# Patient Record
Sex: MALE | Race: WHITE | NOT HISPANIC OR LATINO | URBAN - METROPOLITAN AREA
[De-identification: names, ages, dates, MRNs, and addresses within clinical notes are randomized per-mention and may not be internally consistent; named-entity substitution may affect disease eponyms.]

---

## 2024-10-20 ENCOUNTER — INPATIENT (INPATIENT)
Facility: HOSPITAL | Age: 84
LOS: 2 days | Discharge: HOME HEALTH | DRG: 177 | End: 2024-10-23
Attending: FAMILY MEDICINE | Admitting: STUDENT IN AN ORGANIZED HEALTH CARE EDUCATION/TRAINING PROGRAM
Payer: MEDICARE

## 2024-10-20 VITALS
OXYGEN SATURATION: 87 % | HEART RATE: 123 BPM | SYSTOLIC BLOOD PRESSURE: 155 MMHG | DIASTOLIC BLOOD PRESSURE: 83 MMHG | TEMPERATURE: 99 F | RESPIRATION RATE: 18 BRPM

## 2024-10-20 DIAGNOSIS — J18.9 PNEUMONIA, UNSPECIFIED ORGANISM: ICD-10-CM

## 2024-10-20 PROBLEM — J44.9 CHRONIC OBSTRUCTIVE PULMONARY DISEASE, UNSPECIFIED: Chronic | Status: ACTIVE | Noted: 2021-11-18

## 2024-10-20 PROBLEM — I48.91 UNSPECIFIED ATRIAL FIBRILLATION: Chronic | Status: ACTIVE | Noted: 2019-11-04

## 2024-10-20 PROBLEM — I48.91 UNSPECIFIED ATRIAL FIBRILLATION: Chronic | Status: ACTIVE | Noted: 2019-11-02

## 2024-10-20 PROBLEM — I10 ESSENTIAL (PRIMARY) HYPERTENSION: Chronic | Status: ACTIVE | Noted: 2021-11-18

## 2024-10-20 LAB
ALBUMIN SERPL ELPH-MCNC: 3 G/DL — LOW (ref 3.3–5)
ALP SERPL-CCNC: 103 U/L — SIGNIFICANT CHANGE UP (ref 40–120)
ALT FLD-CCNC: 27 U/L — SIGNIFICANT CHANGE UP (ref 12–78)
ANION GAP SERPL CALC-SCNC: 5 MMOL/L — SIGNIFICANT CHANGE UP (ref 5–17)
APTT BLD: 49.8 SEC — HIGH (ref 24.5–35.6)
AST SERPL-CCNC: 24 U/L — SIGNIFICANT CHANGE UP (ref 15–37)
BASOPHILS # BLD AUTO: 0.04 K/UL — SIGNIFICANT CHANGE UP (ref 0–0.2)
BASOPHILS NFR BLD AUTO: 0.4 % — SIGNIFICANT CHANGE UP (ref 0–2)
BILIRUB SERPL-MCNC: 0.7 MG/DL — SIGNIFICANT CHANGE UP (ref 0.2–1.2)
BUN SERPL-MCNC: 26 MG/DL — HIGH (ref 7–23)
CALCIUM SERPL-MCNC: 9 MG/DL — SIGNIFICANT CHANGE UP (ref 8.5–10.1)
CHLORIDE SERPL-SCNC: 103 MMOL/L — SIGNIFICANT CHANGE UP (ref 96–108)
CO2 SERPL-SCNC: 26 MMOL/L — SIGNIFICANT CHANGE UP (ref 22–31)
CREAT SERPL-MCNC: 1.17 MG/DL — SIGNIFICANT CHANGE UP (ref 0.5–1.3)
EGFR: 61 ML/MIN/1.73M2 — SIGNIFICANT CHANGE UP
EOSINOPHIL # BLD AUTO: 0.03 K/UL — SIGNIFICANT CHANGE UP (ref 0–0.5)
EOSINOPHIL NFR BLD AUTO: 0.3 % — SIGNIFICANT CHANGE UP (ref 0–6)
FLUAV AG NPH QL: SIGNIFICANT CHANGE UP
FLUBV AG NPH QL: SIGNIFICANT CHANGE UP
GLUCOSE SERPL-MCNC: 128 MG/DL — HIGH (ref 70–99)
HCT VFR BLD CALC: 37.4 % — LOW (ref 39–50)
HGB BLD-MCNC: 12.4 G/DL — LOW (ref 13–17)
IMM GRANULOCYTES NFR BLD AUTO: 0.5 % — SIGNIFICANT CHANGE UP (ref 0–0.9)
INR BLD: 1.31 RATIO — HIGH (ref 0.85–1.16)
LACTATE SERPL-SCNC: 1.3 MMOL/L — SIGNIFICANT CHANGE UP (ref 0.7–2)
LYMPHOCYTES # BLD AUTO: 1.02 K/UL — SIGNIFICANT CHANGE UP (ref 1–3.3)
LYMPHOCYTES # BLD AUTO: 9.9 % — LOW (ref 13–44)
MAGNESIUM SERPL-MCNC: 1.6 MG/DL — SIGNIFICANT CHANGE UP (ref 1.6–2.6)
MCHC RBC-ENTMCNC: 32.1 PG — SIGNIFICANT CHANGE UP (ref 27–34)
MCHC RBC-ENTMCNC: 33.2 GM/DL — SIGNIFICANT CHANGE UP (ref 32–36)
MCV RBC AUTO: 96.9 FL — SIGNIFICANT CHANGE UP (ref 80–100)
MONOCYTES # BLD AUTO: 1.38 K/UL — HIGH (ref 0–0.9)
MONOCYTES NFR BLD AUTO: 13.5 % — SIGNIFICANT CHANGE UP (ref 2–14)
NEUTROPHILS # BLD AUTO: 7.74 K/UL — HIGH (ref 1.8–7.4)
NEUTROPHILS NFR BLD AUTO: 75.4 % — SIGNIFICANT CHANGE UP (ref 43–77)
NT-PROBNP SERPL-SCNC: 2072 PG/ML — HIGH (ref 0–450)
PLATELET # BLD AUTO: 291 K/UL — SIGNIFICANT CHANGE UP (ref 150–400)
POTASSIUM SERPL-MCNC: 4.3 MMOL/L — SIGNIFICANT CHANGE UP (ref 3.5–5.3)
POTASSIUM SERPL-SCNC: 4.3 MMOL/L — SIGNIFICANT CHANGE UP (ref 3.5–5.3)
PROT SERPL-MCNC: 7.1 GM/DL — SIGNIFICANT CHANGE UP (ref 6–8.3)
PROTHROM AB SERPL-ACNC: 15 SEC — HIGH (ref 9.9–13.4)
RBC # BLD: 3.86 M/UL — LOW (ref 4.2–5.8)
RBC # FLD: 12.9 % — SIGNIFICANT CHANGE UP (ref 10.3–14.5)
RSV RNA NPH QL NAA+NON-PROBE: SIGNIFICANT CHANGE UP
SARS-COV-2 RNA SPEC QL NAA+PROBE: SIGNIFICANT CHANGE UP
SODIUM SERPL-SCNC: 134 MMOL/L — LOW (ref 135–145)
TROPONIN I, HIGH SENSITIVITY RESULT: 14.38 NG/L — SIGNIFICANT CHANGE UP
WBC # BLD: 10.26 K/UL — SIGNIFICANT CHANGE UP (ref 3.8–10.5)
WBC # FLD AUTO: 10.26 K/UL — SIGNIFICANT CHANGE UP (ref 3.8–10.5)

## 2024-10-20 PROCEDURE — 97116 GAIT TRAINING THERAPY: CPT | Mod: GP

## 2024-10-20 PROCEDURE — 80162 ASSAY OF DIGOXIN TOTAL: CPT

## 2024-10-20 PROCEDURE — 71275 CT ANGIOGRAPHY CHEST: CPT | Mod: 26

## 2024-10-20 PROCEDURE — 93010 ELECTROCARDIOGRAM REPORT: CPT

## 2024-10-20 PROCEDURE — 94640 AIRWAY INHALATION TREATMENT: CPT

## 2024-10-20 PROCEDURE — 99285 EMERGENCY DEPT VISIT HI MDM: CPT

## 2024-10-20 PROCEDURE — 84443 ASSAY THYROID STIM HORMONE: CPT

## 2024-10-20 PROCEDURE — 93306 TTE W/DOPPLER COMPLETE: CPT

## 2024-10-20 PROCEDURE — 80048 BASIC METABOLIC PNL TOTAL CA: CPT

## 2024-10-20 PROCEDURE — 71275 CT ANGIOGRAPHY CHEST: CPT | Mod: MC

## 2024-10-20 PROCEDURE — 76376 3D RENDER W/INTRP POSTPROCES: CPT

## 2024-10-20 PROCEDURE — 71045 X-RAY EXAM CHEST 1 VIEW: CPT | Mod: 26

## 2024-10-20 PROCEDURE — 85027 COMPLETE CBC AUTOMATED: CPT

## 2024-10-20 PROCEDURE — 36415 COLL VENOUS BLD VENIPUNCTURE: CPT

## 2024-10-20 PROCEDURE — 99223 1ST HOSP IP/OBS HIGH 75: CPT

## 2024-10-20 PROCEDURE — 97163 PT EVAL HIGH COMPLEX 45 MIN: CPT | Mod: GP

## 2024-10-20 PROCEDURE — 85025 COMPLETE CBC W/AUTO DIFF WBC: CPT

## 2024-10-20 RX ORDER — IPRATROPIUM BROMIDE AND ALBUTEROL SULFATE .5; 2.5 MG/3ML; MG/3ML
3 SOLUTION RESPIRATORY (INHALATION) EVERY 6 HOURS
Refills: 0 | Status: DISCONTINUED | OUTPATIENT
Start: 2024-10-20 | End: 2024-10-23

## 2024-10-20 RX ORDER — TIOTROPIUM BROMIDE INHALATION SPRAY 1.56 UG/1
2 SPRAY, METERED RESPIRATORY (INHALATION) DAILY
Refills: 0 | Status: DISCONTINUED | OUTPATIENT
Start: 2024-10-20 | End: 2024-10-22

## 2024-10-20 RX ORDER — AMLODIPINE BESYLATE 10 MG
10 TABLET ORAL DAILY
Refills: 0 | Status: DISCONTINUED | OUTPATIENT
Start: 2024-10-20 | End: 2024-10-23

## 2024-10-20 RX ORDER — DILTIAZEM HCL 5 MG/ML
10 VIAL (ML) INTRAVENOUS ONCE
Refills: 0 | Status: DISCONTINUED | OUTPATIENT
Start: 2024-10-20 | End: 2024-10-22

## 2024-10-20 RX ORDER — IPRATROPIUM BROMIDE AND ALBUTEROL SULFATE .5; 2.5 MG/3ML; MG/3ML
3 SOLUTION RESPIRATORY (INHALATION) ONCE
Refills: 0 | Status: COMPLETED | OUTPATIENT
Start: 2024-10-20 | End: 2024-10-20

## 2024-10-20 RX ORDER — CEFTRIAXONE SODIUM 10 G
1000 VIAL (EA) INJECTION ONCE
Refills: 0 | Status: COMPLETED | OUTPATIENT
Start: 2024-10-20 | End: 2024-10-20

## 2024-10-20 RX ORDER — DOXYCYCLINE HYCLATE 100 MG/1
100 TABLET, FILM COATED ORAL EVERY 12 HOURS
Refills: 0 | Status: DISCONTINUED | OUTPATIENT
Start: 2024-10-20 | End: 2024-10-23

## 2024-10-20 RX ORDER — AMLODIPINE BESYLATE 10 MG
1 TABLET ORAL
Refills: 0 | DISCHARGE

## 2024-10-20 RX ORDER — METHYLPREDNISOLONE ACETATE 80 MG/ML
125 INJECTION, SUSPENSION INTRALESIONAL; INTRAMUSCULAR; INTRASYNOVIAL; SOFT TISSUE ONCE
Refills: 0 | Status: COMPLETED | OUTPATIENT
Start: 2024-10-20 | End: 2024-10-20

## 2024-10-20 RX ORDER — POTASSIUM CHLORIDE 10 MEQ
10 TABLET, EXTENDED RELEASE ORAL DAILY
Refills: 0 | Status: DISCONTINUED | OUTPATIENT
Start: 2024-10-20 | End: 2024-10-20

## 2024-10-20 RX ORDER — FUROSEMIDE 40 MG
40 TABLET ORAL ONCE
Refills: 0 | Status: COMPLETED | OUTPATIENT
Start: 2024-10-20 | End: 2024-10-20

## 2024-10-20 RX ORDER — MELATONIN 5 MG
3 TABLET ORAL AT BEDTIME
Refills: 0 | Status: DISCONTINUED | OUTPATIENT
Start: 2024-10-20 | End: 2024-10-23

## 2024-10-20 RX ORDER — ONDANSETRON HYDROCHLORIDE 2 MG/ML
4 INJECTION, SOLUTION INTRAMUSCULAR; INTRAVENOUS EVERY 6 HOURS
Refills: 0 | Status: DISCONTINUED | OUTPATIENT
Start: 2024-10-20 | End: 2024-10-23

## 2024-10-20 RX ORDER — METHYLPREDNISOLONE ACETATE 80 MG/ML
40 INJECTION, SUSPENSION INTRALESIONAL; INTRAMUSCULAR; INTRASYNOVIAL; SOFT TISSUE DAILY
Refills: 0 | Status: COMPLETED | OUTPATIENT
Start: 2024-10-20 | End: 2024-10-23

## 2024-10-20 RX ORDER — AZITHROMYCIN DIHYDRATE 200 MG/5ML
500 POWDER, FOR SUSPENSION ORAL ONCE
Refills: 0 | Status: COMPLETED | OUTPATIENT
Start: 2024-10-20 | End: 2024-10-20

## 2024-10-20 RX ORDER — FUROSEMIDE 40 MG
1 TABLET ORAL
Refills: 0 | DISCHARGE

## 2024-10-20 RX ORDER — FUROSEMIDE 40 MG
40 TABLET ORAL DAILY
Refills: 0 | Status: DISCONTINUED | OUTPATIENT
Start: 2024-10-20 | End: 2024-10-23

## 2024-10-20 RX ORDER — MAGNESIUM, ALUMINUM HYDROXIDE 200-200 MG
30 TABLET,CHEWABLE ORAL EVERY 4 HOURS
Refills: 0 | Status: DISCONTINUED | OUTPATIENT
Start: 2024-10-20 | End: 2024-10-23

## 2024-10-20 RX ORDER — ACETAMINOPHEN 500 MG
650 TABLET ORAL EVERY 6 HOURS
Refills: 0 | Status: DISCONTINUED | OUTPATIENT
Start: 2024-10-20 | End: 2024-10-23

## 2024-10-20 RX ORDER — CEFTRIAXONE SODIUM 10 G
1000 VIAL (EA) INJECTION EVERY 24 HOURS
Refills: 0 | Status: DISCONTINUED | OUTPATIENT
Start: 2024-10-21 | End: 2024-10-23

## 2024-10-20 RX ORDER — ACETAMINOPHEN 500 MG
1000 TABLET ORAL ONCE
Refills: 0 | Status: COMPLETED | OUTPATIENT
Start: 2024-10-20 | End: 2024-10-20

## 2024-10-20 RX ORDER — RIVAROXABAN 20 MG/1
20 TABLET, FILM COATED ORAL
Refills: 0 | Status: DISCONTINUED | OUTPATIENT
Start: 2024-10-20 | End: 2024-10-23

## 2024-10-20 RX ORDER — METOPROLOL TARTRATE 50 MG
100 TABLET ORAL DAILY
Refills: 0 | Status: DISCONTINUED | OUTPATIENT
Start: 2024-10-20 | End: 2024-10-23

## 2024-10-20 RX ORDER — DIGOXIN 250 MCG
250 TABLET ORAL DAILY
Refills: 0 | Status: DISCONTINUED | OUTPATIENT
Start: 2024-10-20 | End: 2024-10-22

## 2024-10-20 RX ORDER — METOPROLOL TARTRATE 50 MG
5 TABLET ORAL ONCE
Refills: 0 | Status: COMPLETED | OUTPATIENT
Start: 2024-10-20 | End: 2024-10-20

## 2024-10-20 RX ADMIN — Medication 5 MILLIGRAM(S): at 06:56

## 2024-10-20 RX ADMIN — DOXYCYCLINE HYCLATE 110 MILLIGRAM(S): 100 TABLET, FILM COATED ORAL at 22:40

## 2024-10-20 RX ADMIN — Medication 400 MILLIGRAM(S): at 06:10

## 2024-10-20 RX ADMIN — Medication 40 MILLIGRAM(S): at 06:02

## 2024-10-20 RX ADMIN — Medication 1000 MILLIGRAM(S): at 07:15

## 2024-10-20 RX ADMIN — AZITHROMYCIN DIHYDRATE 255 MILLIGRAM(S): 200 POWDER, FOR SUSPENSION ORAL at 05:03

## 2024-10-20 RX ADMIN — Medication 80 MILLIGRAM(S): at 22:40

## 2024-10-20 RX ADMIN — Medication 10 MILLIGRAM(S): at 18:33

## 2024-10-20 RX ADMIN — METHYLPREDNISOLONE ACETATE 125 MILLIGRAM(S): 80 INJECTION, SUSPENSION INTRALESIONAL; INTRAMUSCULAR; INTRASYNOVIAL; SOFT TISSUE at 08:03

## 2024-10-20 RX ADMIN — IPRATROPIUM BROMIDE AND ALBUTEROL SULFATE 3 MILLILITER(S): .5; 2.5 SOLUTION RESPIRATORY (INHALATION) at 08:03

## 2024-10-20 RX ADMIN — RIVAROXABAN 20 MILLIGRAM(S): 20 TABLET, FILM COATED ORAL at 18:33

## 2024-10-20 RX ADMIN — Medication 1000 MILLIGRAM(S): at 04:54

## 2024-10-20 RX ADMIN — IPRATROPIUM BROMIDE AND ALBUTEROL SULFATE 3 MILLILITER(S): .5; 2.5 SOLUTION RESPIRATORY (INHALATION) at 04:59

## 2024-10-20 RX ADMIN — Medication 250 MICROGRAM(S): at 18:33

## 2024-10-20 RX ADMIN — Medication 100 MILLIGRAM(S): at 12:43

## 2024-10-20 NOTE — H&P ADULT - ASSESSMENT
84-year-old male with past medical history of A-fib on Xarelto, HTN, HLD, COPD (not on home oxygen), presents to [facility] complaining of shortness of breath x 2 days.      #Acute Hypoxic Respiratory Failure, unable to rule out if Gram negative in etiology, Hx of COPD, possible COPD exacerbation   -RVP: negative   -CXR: as above   -CTA as above   -IV antibiotics: Doxy/Rocephin    -Solumedrol 40mg daily  -Spiriva   -Albuterol PRN   -supplemental oxygen PRN with target SpO2: 92-96%  -incentive spirometry    #Afib with RVR   -likely exacerbated due to illness   -c/w Xarelto   -c/w Digoxin  -c/w Metoprolol Succinate     #Abnormal CT findings  -9 mm pulmonary nodule in the right upper lobe, new from prior, nonspecific areas of architectural distortion in the left upper lobe, additional groundglass area space passages in the right upper lobe and superior segment of the right lower lobe, 6 mm spiculated pulmonary nodule in the right upper lobe series new from prior: Follow-up as outpatient  -Multiple thyroid nodules: Follow-up as outpatient  -Multiple punctate calcification in the pancreas: Follow-up as outpatient  -Mild thickening of the left adrenal gland: Follow-up as outpatient  -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

## 2024-10-20 NOTE — ED ADULT NURSE REASSESSMENT NOTE - NS ED NURSE REASSESS COMMENT FT1
Received patient AxOx4, spouse at bedside. VSS. Patient with improvement to HR to the 80's to low 100's. -110. MD horowitz at bedside and aware. Advised to hold IV Cardizem at this time and continue to monitor HR and BP. Patient with dyspnea on exertion, oxygen via nasal cannula in place at 3L. Tolerating well. Updated on plan of care, all questions and concerns addressed. Cardiac monitoring maintained. Stretcher locked and in lowest position. Comfort and safety measures maintained, call bell within reach, will continue to monitor.

## 2024-10-20 NOTE — ED PROVIDER NOTE - CLINICAL SUMMARY MEDICAL DECISION MAKING FREE TEXT BOX
83 yo M with COPD now with URI from travel.  Viral illness vs pneumonia vs COPD exacerbation vs afib with CHF in differential.      EKG, CXR, labs, Flu/RSV/Covid swab, treatment, O2 and likely admission.

## 2024-10-20 NOTE — ED PROVIDER NOTE - RESPIRATORY, MLM
+tachypnea; mild retractions and conversational dyspnea; +end expiratory wheezing in bases; +coarse upper airway sounds

## 2024-10-20 NOTE — ED ADULT TRIAGE NOTE - CHIEF COMPLAINT QUOTE
BIBEMS from home c/o difficulty breathing. PMH COPD. Reports being away in [place] and everyone on the trip got sick with "some sort of pneumonia." +HA, subjective fevers, productive cough. Pt took 1x dose of at- home inhaler with no relief, given 1x Albuterol by EMS. O2% on room air 87% placed on 4L NC.  in triage, sent for EKG. Denies chest pain/ tightness. Airway patent, speaking in full complete sentences.

## 2024-10-20 NOTE — ED PROVIDER NOTE - PROGRESS NOTE DETAILS
JG: Patient with CHF and pneumonia.  Med list contains lasix 20mg daily.  Will give lasix 40mg IV to double oral dose.  Discussed case with hospitalist for admission to [facility]. HR improved to 107, but will give some metoprolol for rate control.

## 2024-10-20 NOTE — ED PROVIDER NOTE - OBJECTIVE STATEMENT
Pt. is a 85 yo M with hx of COPD (not on home O2), hx of HTN, hyperlipidemia, Afib on xarelto presents with fever, chills, wet cough and SOB X 2 days.  States he was away on a cruise to [facility] for 2 wks. States there were many passengers with cough and illness.  Patient used inhaler without relief.  Came to the hospital because he believes he could have pneumonia.  PMD: Linker

## 2024-10-20 NOTE — ED ADULT NURSE NOTE - NSFALLLASTSIX_ED_ALL_ED
Multiple abrasions on head and all extremities. Dressings places on bilateral arms and legs.  Bilateral stasis dermatitis.  Healing ulceration to R posterior lower leg. No.

## 2024-10-20 NOTE — ED PROVIDER NOTE - CARE PLAN
Principal Discharge DX:	Upper respiratory infection  Secondary Diagnosis:	Rapid atrial fibrillation   1 Principal Discharge DX:	Community acquired pneumonia  Secondary Diagnosis:	Rapid atrial fibrillation  Secondary Diagnosis:	Acute on chronic systolic congestive heart failure

## 2024-10-20 NOTE — ED PROVIDER NOTE - CPE EDP [facility] NORM
pts dad wants to speak to dr Gilford Barth about being recommended to a new doctor about a second opinion.  pls adv  asap  [phone]        **Pts dad was a bit hard to understand ** normal...

## 2024-10-20 NOTE — H&P ADULT - HISTORY OF PRESENT ILLNESS
84-year-old male with past medical history of A-fib on Xarelto, HTN, HLD, COPD (not on home oxygen), presents to [facility] complaining of shortness of breath x 2 days.  Patient reports a productive cough, subjective fever.  Reports a recent travel to [facility] about 2 weeks ago.  Has no other acute complaints.  Patient denies headache, dizziness, change in vision, blurry vision, anosmia, ageusia, chest pain, palpitations, abdominal pain, nausea, vomiting, diarrhea, constipation, hematochezia, melena, change in bowel movement, weight loss, dysuria, urinary frequency, urgency, hematuria, numbness, weakness or tingling,   In ED,   Vitals: T: 99.2, HR: 123, BP: 155/83, RR: 18, SpO2 87% on room air  Labs: H&H: 12.4 with 37.4, Na+: 134, trops x 1: 14.3, BNP: 2K, lactate: 1.3.  RVP panel: Negative  Imaging: Angio: Emphysema, 9 mm pulmonary nodule, peribronchial thickening/air space consolidation in bilateral lower lobes, mediastinal lymphadenopathy, thyroid nodules    In ED, patient received one-time dose of azithromycin/Rocephin

## 2024-10-20 NOTE — PATIENT PROFILE ADULT - FALL HARM RISK - HARM RISK INTERVENTIONS

## 2024-10-20 NOTE — H&P ADULT - NSHPPHYSICALEXAM_GEN_ALL_CORE
Vitals  T(F): 98.9 (10-20-24 @ 06:50), Max: 100.3 (10-20-24 @ 06:14)  HR: 109 (10-20-24 @ 06:50) (106 - 123)  BP: 104/61 (10-20-24 @ 06:50) (104/61 - 155/83)  RR: 18 (10-20-24 @ 06:50) (18 - 18)  SpO2: 90% (10-20-24 @ 06:50) (87% - 91%)    PHYSICAL EXAM   Gen: NAD, comfortable, AA&Ox4  HEENT: head atrumatic and normocephalic, PEERLA, EOMI,  no gross abnormalities of ears, mucous membranes moist, no oral lesions, neck supple without masses/goiter/lymphadenopathy, no JVD  CVS: +s1, s2, regular rate and rhythm, no murmurs, rubs or gallops, no thrill, normal PMI  Pulmonary: normal respiratory effort, clear to auscultation b/l, no wheezes/crackles/rhonchi  Abdomen: soft, non-tender, non-distended, +bowel sounds in all 4 quadrants, no masses noted, no guarding or rigidity   Back: no scoliosis, lordosis, or kyphosis, no point tenderness, no CVA tenderness   Extremities: no pedal edema, pedal pulses palpable, <2 sec. cap refill   Skin: nl warm and dry, no wounds   Neuro: answering questions appropriately, face symmetric, sensation equal bilaterally in face, tongue midline, no dysarthria, 5/5 strength in upper and lower extremities bilaterally, sensation intact in upper and lower extremities bilaterally, nl finger to nose, and nl heel to shin Vitals  T(F): 98.9 (10-20-24 @ 06:50), Max: 100.3 (10-20-24 @ 06:14)  HR: 109 (10-20-24 @ 06:50) (106 - 123)  BP: 104/61 (10-20-24 @ 06:50) (104/61 - 155/83)  RR: 18 (10-20-24 @ 06:50) (18 - 18)  SpO2: 90% (10-20-24 @ 06:50) (87% - 91%)    PHYSICAL EXAM   Gen: NAD, comfortable, AA&Ox4  HEENT: head atrumatic and normocephalic, PEERLA, EOMI,  no gross abnormalities of ears, mucous membranes moist, no oral lesions, neck supple without masses/goiter/lymphadenopathy, no JVD  CVS: +s1, s2, +irregular irregular   Pulmonary: +rhonchi diffusely   Abdomen: soft, non-tender, non-distended, +bowel sounds in all 4 quadrants, no masses noted, no guarding or rigidity   Back: no scoliosis, lordosis, or kyphosis, no point tenderness, no CVA tenderness   Extremities: no pedal edema, pedal pulses palpable, <2 sec. cap refill   Skin: nl warm and dry, no wounds   Neuro: grossly non-focal

## 2024-10-20 NOTE — ED ADULT TRIAGE NOTE - DIRECT TO ROOM CARE INITIATED:
keep dressing dry, clean, intact, for 3 days. After ASU pre-op days, remove dressing and leave steri strips on. You can shower with steri strips on. If it fall off after shower is OK, if not you leave it there, it will fall off by itself in 2-3 days.
20-Oct-2024 03:34

## 2024-10-20 NOTE — ED ADULT NURSE NOTE - CAS TRG GENERAL AIRWAY, MLM
----- Message from Poonam Morejon sent at 5/13/2020 10:40 AM CDT -----  Type:  Patient Returning Call    Who Called: Gaudencio Lombardo  Who Left Message for Patient:  Dr Lopez's office  Does the patient know what this is regarding?:    Would the patient rather a call back or a response via [facility]?   Call back  Best Call Back Number:   [phone]   Additional Information:Pt states she is returning your call from yesterday//please call again//thanks/anthony     Patent

## 2024-10-20 NOTE — ED ADULT NURSE REASSESSMENT NOTE - NS ED NURSE REASSESS COMMENT FT1
Received bedside report from previous RN RAYNE Tam. Patient is lying on stretcher on semi-fowlers position. No signs of distress noted, Vital signs stable. Patient has no complaints at this time. Call bell within reach, bed in lowest position, safety and comfort maintained.

## 2024-10-20 NOTE — ED ADULT NURSE NOTE - OBJECTIVE STATEMENT
Pt presents to the ED c/o difficulty breathing. Pt reports he couldn't breathe, pt states "I went on a boat to [facility] and everyone on the trip got sick with some sort of pneumonia." Pt reports endorsing wet cough for about 2 weeks, used inhaler without relief. Pt reports hx of COPD, not on O2 at home. Pt reports hx of afib on Xarelto. Pt reports low grade fever, denies N/V/D and chills at this time.

## 2024-10-21 ENCOUNTER — RESULT REVIEW (OUTPATIENT)
Age: 84
End: 2024-10-21

## 2024-10-21 LAB
ANION GAP SERPL CALC-SCNC: 4 MMOL/L — LOW (ref 5–17)
BUN SERPL-MCNC: 34 MG/DL — HIGH (ref 7–23)
CALCIUM SERPL-MCNC: 9.4 MG/DL — SIGNIFICANT CHANGE UP (ref 8.5–10.1)
CHLORIDE SERPL-SCNC: 105 MMOL/L — SIGNIFICANT CHANGE UP (ref 96–108)
CO2 SERPL-SCNC: 26 MMOL/L — SIGNIFICANT CHANGE UP (ref 22–31)
CREAT SERPL-MCNC: 1.06 MG/DL — SIGNIFICANT CHANGE UP (ref 0.5–1.3)
DIGOXIN SERPL-MCNC: 1.32 NG/ML — SIGNIFICANT CHANGE UP (ref 0.8–2)
EGFR: 69 ML/MIN/1.73M2 — SIGNIFICANT CHANGE UP
GLUCOSE SERPL-MCNC: 146 MG/DL — HIGH (ref 70–99)
HCT VFR BLD CALC: 37.3 % — LOW (ref 39–50)
HGB BLD-MCNC: 12.4 G/DL — LOW (ref 13–17)
MCHC RBC-ENTMCNC: 32.2 PG — SIGNIFICANT CHANGE UP (ref 27–34)
MCHC RBC-ENTMCNC: 33.2 GM/DL — SIGNIFICANT CHANGE UP (ref 32–36)
MCV RBC AUTO: 96.9 FL — SIGNIFICANT CHANGE UP (ref 80–100)
PLATELET # BLD AUTO: 355 K/UL — SIGNIFICANT CHANGE UP (ref 150–400)
POTASSIUM SERPL-MCNC: 4.3 MMOL/L — SIGNIFICANT CHANGE UP (ref 3.5–5.3)
POTASSIUM SERPL-SCNC: 4.3 MMOL/L — SIGNIFICANT CHANGE UP (ref 3.5–5.3)
RBC # BLD: 3.85 M/UL — LOW (ref 4.2–5.8)
RBC # FLD: 12.8 % — SIGNIFICANT CHANGE UP (ref 10.3–14.5)
SODIUM SERPL-SCNC: 135 MMOL/L — SIGNIFICANT CHANGE UP (ref 135–145)
TSH SERPL-MCNC: 0.34 UU/ML — SIGNIFICANT CHANGE UP (ref 0.34–4.82)
WBC # BLD: 14.27 K/UL — HIGH (ref 3.8–10.5)
WBC # FLD AUTO: 14.27 K/UL — HIGH (ref 3.8–10.5)

## 2024-10-21 PROCEDURE — 93306 TTE W/DOPPLER COMPLETE: CPT | Mod: 26

## 2024-10-21 PROCEDURE — 99233 SBSQ HOSP IP/OBS HIGH 50: CPT

## 2024-10-21 PROCEDURE — 76376 3D RENDER W/INTRP POSTPROCES: CPT | Mod: 26

## 2024-10-21 RX ADMIN — Medication 1000 MILLIGRAM(S): at 06:28

## 2024-10-21 RX ADMIN — Medication 10 MILLIGRAM(S): at 09:52

## 2024-10-21 RX ADMIN — TIOTROPIUM BROMIDE INHALATION SPRAY 2 PUFF(S): 1.56 SPRAY, METERED RESPIRATORY (INHALATION) at 09:08

## 2024-10-21 RX ADMIN — Medication 40 MILLIGRAM(S): at 07:18

## 2024-10-21 RX ADMIN — Medication 100 MILLIGRAM(S): at 09:52

## 2024-10-21 RX ADMIN — RIVAROXABAN 20 MILLIGRAM(S): 20 TABLET, FILM COATED ORAL at 16:50

## 2024-10-21 RX ADMIN — DOXYCYCLINE HYCLATE 110 MILLIGRAM(S): 100 TABLET, FILM COATED ORAL at 21:12

## 2024-10-21 RX ADMIN — Medication 80 MILLIGRAM(S): at 21:12

## 2024-10-21 RX ADMIN — DOXYCYCLINE HYCLATE 110 MILLIGRAM(S): 100 TABLET, FILM COATED ORAL at 09:52

## 2024-10-21 RX ADMIN — Medication 250 MICROGRAM(S): at 07:18

## 2024-10-21 RX ADMIN — METHYLPREDNISOLONE ACETATE 40 MILLIGRAM(S): 80 INJECTION, SUSPENSION INTRALESIONAL; INTRAMUSCULAR; INTRASYNOVIAL; SOFT TISSUE at 07:18

## 2024-10-21 NOTE — PHYSICAL THERAPY INITIAL EVALUATION ADULT - PERTINENT HX OF CURRENT PROBLEM, REHAB EVAL
Pt admitted to  secondary to SOB for 2 days PTA. Pt with recent travel to [place] ~ 2 wks ago. CT angio chest: No PE. Suspect bilateral lower lobe PN. Multiple pulmonary nodules. Multiple hypodense thyroid nodules. Chronic compression deformity T9 vertebral body.

## 2024-10-21 NOTE — PHYSICAL THERAPY INITIAL EVALUATION ADULT - GAIT TRAINING, PT EVAL
By D/C: pt will amb without device 200' with independence. By D/C: pt will ascend/descend 5 steps with HR, step to gait pattern, and independence.

## 2024-10-21 NOTE — SBIRT NOTE ADULT - NSSBIRTALCPOSREINDET_GEN_A_CORE
Pt reports to have a wine and or beer with dinner on weekends (F, Sat, Sun).  Denies need for referrals/resources.  Education on healthy lifestyle.

## 2024-10-21 NOTE — PHYSICAL THERAPY INITIAL EVALUATION ADULT - PLANNED THERAPY INTERVENTIONS, PT EVAL
Stair training. Eval, amb, transfers, bed mobility x 15'. Pt left in bed with all observation section equipment/material intact and bed alram activated. Pt with no c/o pain. Will cont to follow./bed mobility training/gait training/transfer training

## 2024-10-21 NOTE — PHYSICAL THERAPY INITIAL EVALUATION ADULT - ADDITIONAL COMMENTS
Pt is a very active individual; Pt was biking, rowing and ambulating all Independently with no AD PTA. Info obtained from eval 1/2022. Pt is a very active individual; Pt was biking, rowing and ambulating all Independently with no AD PTA. Info obtained from eval 1/2022. Update 10/21- info same as previously documented.

## 2024-10-22 LAB
ANION GAP SERPL CALC-SCNC: 6 MMOL/L — SIGNIFICANT CHANGE UP (ref 5–17)
BASOPHILS # BLD AUTO: 0.03 K/UL — SIGNIFICANT CHANGE UP (ref 0–0.2)
BASOPHILS NFR BLD AUTO: 0.2 % — SIGNIFICANT CHANGE UP (ref 0–2)
BUN SERPL-MCNC: 39 MG/DL — HIGH (ref 7–23)
CALCIUM SERPL-MCNC: 9.8 MG/DL — SIGNIFICANT CHANGE UP (ref 8.5–10.1)
CHLORIDE SERPL-SCNC: 110 MMOL/L — HIGH (ref 96–108)
CO2 SERPL-SCNC: 24 MMOL/L — SIGNIFICANT CHANGE UP (ref 22–31)
CREAT SERPL-MCNC: 1.11 MG/DL — SIGNIFICANT CHANGE UP (ref 0.5–1.3)
EGFR: 65 ML/MIN/1.73M2 — SIGNIFICANT CHANGE UP
EOSINOPHIL # BLD AUTO: 0 K/UL — SIGNIFICANT CHANGE UP (ref 0–0.5)
EOSINOPHIL NFR BLD AUTO: 0 % — SIGNIFICANT CHANGE UP (ref 0–6)
GLUCOSE SERPL-MCNC: 126 MG/DL — HIGH (ref 70–99)
HCT VFR BLD CALC: 36.4 % — LOW (ref 39–50)
HGB BLD-MCNC: 11.9 G/DL — LOW (ref 13–17)
IMM GRANULOCYTES NFR BLD AUTO: 1.9 % — HIGH (ref 0–0.9)
LYMPHOCYTES # BLD AUTO: 0.95 K/UL — LOW (ref 1–3.3)
LYMPHOCYTES # BLD AUTO: 5.8 % — LOW (ref 13–44)
MCHC RBC-ENTMCNC: 32.1 PG — SIGNIFICANT CHANGE UP (ref 27–34)
MCHC RBC-ENTMCNC: 32.7 GM/DL — SIGNIFICANT CHANGE UP (ref 32–36)
MCV RBC AUTO: 98.1 FL — SIGNIFICANT CHANGE UP (ref 80–100)
MONOCYTES # BLD AUTO: 1.06 K/UL — HIGH (ref 0–0.9)
MONOCYTES NFR BLD AUTO: 6.5 % — SIGNIFICANT CHANGE UP (ref 2–14)
NEUTROPHILS # BLD AUTO: 14 K/UL — HIGH (ref 1.8–7.4)
NEUTROPHILS NFR BLD AUTO: 85.6 % — HIGH (ref 43–77)
PLATELET # BLD AUTO: 402 K/UL — HIGH (ref 150–400)
POTASSIUM SERPL-MCNC: 4.5 MMOL/L — SIGNIFICANT CHANGE UP (ref 3.5–5.3)
POTASSIUM SERPL-SCNC: 4.5 MMOL/L — SIGNIFICANT CHANGE UP (ref 3.5–5.3)
RBC # BLD: 3.71 M/UL — LOW (ref 4.2–5.8)
RBC # FLD: 12.9 % — SIGNIFICANT CHANGE UP (ref 10.3–14.5)
SODIUM SERPL-SCNC: 140 MMOL/L — SIGNIFICANT CHANGE UP (ref 135–145)
WBC # BLD: 16.35 K/UL — HIGH (ref 3.8–10.5)
WBC # FLD AUTO: 16.35 K/UL — HIGH (ref 3.8–10.5)

## 2024-10-22 PROCEDURE — 99233 SBSQ HOSP IP/OBS HIGH 50: CPT

## 2024-10-22 RX ORDER — TIOTROPIUM BROMIDE AND OLODATEROL 3.124; 2.736 UG/1; UG/1
2 SPRAY, METERED RESPIRATORY (INHALATION) DAILY
Refills: 0 | Status: DISCONTINUED | OUTPATIENT
Start: 2024-10-22 | End: 2024-10-23

## 2024-10-22 RX ORDER — DIGOXIN 250 MCG
125 TABLET ORAL DAILY
Refills: 0 | Status: DISCONTINUED | OUTPATIENT
Start: 2024-10-22 | End: 2024-10-23

## 2024-10-22 RX ADMIN — Medication 40 MILLIGRAM(S): at 06:13

## 2024-10-22 RX ADMIN — Medication 125 MICROGRAM(S): at 13:13

## 2024-10-22 RX ADMIN — METHYLPREDNISOLONE ACETATE 40 MILLIGRAM(S): 80 INJECTION, SUSPENSION INTRALESIONAL; INTRAMUSCULAR; INTRASYNOVIAL; SOFT TISSUE at 06:12

## 2024-10-22 RX ADMIN — Medication 100 MILLIGRAM(S): at 09:35

## 2024-10-22 RX ADMIN — DOXYCYCLINE HYCLATE 110 MILLIGRAM(S): 100 TABLET, FILM COATED ORAL at 21:20

## 2024-10-22 RX ADMIN — Medication 1000 MILLIGRAM(S): at 06:12

## 2024-10-22 RX ADMIN — TIOTROPIUM BROMIDE INHALATION SPRAY 2 PUFF(S): 1.56 SPRAY, METERED RESPIRATORY (INHALATION) at 09:52

## 2024-10-22 RX ADMIN — Medication 250 MICROGRAM(S): at 06:13

## 2024-10-22 RX ADMIN — DOXYCYCLINE HYCLATE 110 MILLIGRAM(S): 100 TABLET, FILM COATED ORAL at 09:34

## 2024-10-22 RX ADMIN — RIVAROXABAN 20 MILLIGRAM(S): 20 TABLET, FILM COATED ORAL at 16:54

## 2024-10-22 RX ADMIN — Medication 80 MILLIGRAM(S): at 21:20

## 2024-10-22 RX ADMIN — Medication 10 MILLIGRAM(S): at 09:35

## 2024-10-22 NOTE — CONSULT NOTE ADULT - ASSESSMENT
84-year-old male with past medical history of A-fib on Xarelto, HTN, HLD, COPD (not on home oxygen), presents to [facility] complaining of shortness of breath x 2 days.  Patient reports a productive cough, subjective fever.  Reports a recent travel to [facility] about 2 weeks ago.  Has no other acute complaints.  Patient denies headache, dizziness, change in vision, blurry vision, anosmia, ageusia, chest pain, palpitations, abdominal pain, nausea, vomiting, diarrhea, constipation, hematochezia, melena, change in bowel movement, weight loss, dysuria, urinary frequency, urgency, hematuria, numbness, weakness or tingling,   In ED,   Vitals: T: 99.2, HR: 123, BP: 155/83, RR: 18, SpO2 87% on room air  Labs: H&H: 12.4 with 37.4, Na+: 134, trops x 1: 14.3, BNP: 2K, lactate: 1.3.  RVP panel: Negative  Imaging: Angio: Emphysema, 9 mm pulmonary nodule, peribronchial thickening/air space consolidation in bilateral lower lobes, mediastinal lymphadenopathy, thyroid nodules    In ED, patient received one-time dose of azithromycin/Rocephin    Assessment / plan;  Bilateral lower lobe consolidation / pneumonia post travel hx  Acute COPD exacerbation  hx Vats for spont pneumothorax 2019  indeterminate pulm nodules will need fu- discussed  no PE on CTA  eval for hypoxemia on exertion    agree with admission  IV antibiotics  sterids  sputum gs/ cx  walk test  will fu with you  outpt ct scan in 3 months recommended for fu

## 2024-10-22 NOTE — CONSULT NOTE ADULT - SUBJECTIVE AND OBJECTIVE BOX
Patient is a 84y old  Male who presents with a chief complaint of sob (21 Oct 2024 18:54)      HPI:  84-year-old male with past medical history of A-fib on Xarelto, HTN, HLD, COPD (not on home oxygen), presents to [facility] complaining of shortness of breath x 2 days.  Patient reports a productive cough, subjective fever.  Reports a recent travel to [facility] about 2 weeks ago.  Has no other acute complaints.  Patient denies headache, dizziness, change in vision, blurry vision, anosmia, ageusia, chest pain, palpitations, abdominal pain, nausea, vomiting, diarrhea, constipation, hematochezia, melena, change in bowel movement, weight loss, dysuria, urinary frequency, urgency, hematuria, numbness, weakness or tingling,   In ED,   Vitals: T: 99.2, HR: 123, BP: 155/83, RR: 18, SpO2 87% on room air  Labs: H&H: 12.4 with 37.4, Na+: 134, trops x 1: 14.3, BNP: 2K, lactate: 1.3.  RVP panel: Negative  Imaging: Angio: Emphysema, 9 mm pulmonary nodule, peribronchial thickening/air space consolidation in bilateral lower lobes, mediastinal lymphadenopathy, thyroid nodules    In ED, patient received one-time dose of azithromycin/Rocephin   (20 Oct 2024 11:34)      PAST MEDICAL & SURGICAL HISTORY:  Afib      Afib      COPD, mild      HTN (hypertension)      No significant past surgical history          PREVIOUS DIAGNOSTIC TESTING:      MEDICATIONS  (STANDING):  amLODIPine   Tablet 10 milliGRAM(s) Oral daily  atorvastatin 80 milliGRAM(s) Oral at bedtime  cefTRIAXone Injectable. 1000 milliGRAM(s) IV Push every 24 hours  digoxin     Tablet 250 MICROGram(s) Oral daily  diltiazem Injectable 10 milliGRAM(s) IV Push once  doxycycline IVPB 100 milliGRAM(s) IV Intermittent every 12 hours  furosemide    Tablet 40 milliGRAM(s) Oral daily  methylPREDNISolone sodium succinate Injectable 40 milliGRAM(s) IV Push daily  metoprolol succinate  milliGRAM(s) Oral daily  rivaroxaban 20 milliGRAM(s) Oral with dinner  tiotropium 2.5 MICROgram(s) Inhaler 2 Puff(s) Inhalation daily    MEDICATIONS  (PRN):  acetaminophen     Tablet .. 650 milliGRAM(s) Oral every 6 hours PRN Mild Pain (1 - 3)  albuterol/ipratropium for Nebulization 3 milliLiter(s) Nebulizer every 6 hours PRN Shortness of Breath and/or Wheezing  aluminum hydroxide/magnesium hydroxide/simethicone Suspension 30 milliLiter(s) Oral every 4 hours PRN Dyspepsia  melatonin 3 milliGRAM(s) Oral at bedtime PRN Insomnia  ondansetron Injectable 4 milliGRAM(s) IV Push every 6 hours PRN Nausea and/or Vomiting      FAMILY HISTORY:  No pertinent family history in first degree relatives        SOCIAL HISTORY:  ***    REVIEW OF SYSTEM:  Pertinent items are noted in HPI.    Vital Signs Last 24 Hrs  T(C): 36.6 (22 Oct 2024 00:17), Max: 36.8 (21 Oct 2024 08:33)  T(F): 97.9 (22 Oct 2024 00:17), Max: 98.2 (21 Oct 2024 08:33)  HR: 68 (22 Oct 2024 00:17) (68 - 98)  BP: 113/59 (22 Oct 2024 00:17) (108/63 - 116/71)  BP(mean): --  RR: 18 (22 Oct 2024 00:17) (18 - 18)  SpO2: 93% (22 Oct 2024 00:17) (93% - 94%)    Parameters below as of 22 Oct 2024 00:17  Patient On (Oxygen Delivery Method): nasal cannula        I&O's Summary    21 Oct 2024 07:01  -  22 Oct 2024 07:00  --------------------------------------------------------  IN: 0 mL / OUT: 800 mL / NET: -800 mL      PHYSICAL EXAM  General Appearance: cooperative, no acute distress,   HEENT: PERRL, conjunctiva clear, EOM's intact, non injected pharynx, no exudate, TM   normal  Neck: Supple, , no adenopathy, thyroid: not enlarged, no carotid bruit or JVD  Back: Symmetric, no  tenderness,no soft tissue tenderness  Lungs: bilateral lower lobe rhonchi, exp wheeze  Heart: Regular rate and rhythm, S1, S2 normal, no murmur, rub or gallop  Abdomen: Soft, non-tender, bowel sounds active , no hepatosplenomegaly  Extremities: no cyanosis or edema, no joint swelling  Skin: Skin color, texture normal, no rashes   Neurologic: Alert and oriented X3 , cranial nerves intact, sensory and motor normal,    ECG:    LABS:                          11.9   16.35 )-----------( 402      ( 22 Oct 2024 07:24 )             36.4     10-21    135  |  105  |  34[H]  ----------------------------<  146[H]  4.3   |  26  |  1.06    Ca    9.4      21 Oct 2024 07:09                Urinalysis Basic - ( 21 Oct 2024 07:09 )    Color: x / Appearance: x / SG: x / pH: x  Gluc: 146 mg/dL / Ketone: x  / Bili: x / Urobili: x   Blood: x / Protein: x / Nitrite: x   Leuk Esterase: x / RBC: x / WBC x   Sq Epi: x / Non Sq Epi: x / Bacteria: x            RADIOLOGY & ADDITIONAL STUDIES:  < from: CT Angio Chest PE Protocol w/ IV Cont (10.20.24 @ 06:38) >  IMPRESSION:  No pulmonary embolism.    Suspect bilateral lower lobe pneumonia as detailed above. Follow-up to   resolution recommended.    Multiple pulmonary nodules, largest measuring up to 9 mm. Fleischner   Society guidelines recommends a follow-up CT scan at 3-6 months, then   consider CT at 18-24 months.    Multiple hypodense thyroid nodules on the right, largest measuring up to   2.5 cm.. Nonemergent ultrasound recommended to further evaluate.    Additional findings as above.    < end of copied text >

## 2024-10-22 NOTE — PHARMACOTHERAPY INTERVENTION NOTE - COMMENTS
Medication history complete. Medications and allergies reviewed with patient and confirmed with . 
digoxin dose recommended to not exceed 0.125 mg/day in patients =65 years old, digoxin level 1.32, goal level for afib less than 1.2 - higher risk of toxicity when level over 1.2, crcl 56  recommended to reduce digoxin from 250mcg to 125mcg  
patient with COPD exacerbation on spiriva (LAMA) at home - eosinophil count <300, escalate to stiolto (LABA/LAMA)

## 2024-10-23 ENCOUNTER — TRANSCRIPTION ENCOUNTER (OUTPATIENT)
Age: 84
End: 2024-10-23

## 2024-10-23 VITALS — OXYGEN SATURATION: 95 % | RESPIRATION RATE: 20 BRPM

## 2024-10-23 LAB
ANION GAP SERPL CALC-SCNC: 4 MMOL/L — LOW (ref 5–17)
BASOPHILS # BLD AUTO: 0.03 K/UL — SIGNIFICANT CHANGE UP (ref 0–0.2)
BASOPHILS NFR BLD AUTO: 0.2 % — SIGNIFICANT CHANGE UP (ref 0–2)
BUN SERPL-MCNC: 37 MG/DL — HIGH (ref 7–23)
CALCIUM SERPL-MCNC: 9.7 MG/DL — SIGNIFICANT CHANGE UP (ref 8.5–10.1)
CHLORIDE SERPL-SCNC: 107 MMOL/L — SIGNIFICANT CHANGE UP (ref 96–108)
CO2 SERPL-SCNC: 30 MMOL/L — SIGNIFICANT CHANGE UP (ref 22–31)
CREAT SERPL-MCNC: 1.15 MG/DL — SIGNIFICANT CHANGE UP (ref 0.5–1.3)
EGFR: 62 ML/MIN/1.73M2 — SIGNIFICANT CHANGE UP
EOSINOPHIL # BLD AUTO: 0 K/UL — SIGNIFICANT CHANGE UP (ref 0–0.5)
EOSINOPHIL NFR BLD AUTO: 0 % — SIGNIFICANT CHANGE UP (ref 0–6)
GLUCOSE SERPL-MCNC: 107 MG/DL — HIGH (ref 70–99)
HCT VFR BLD CALC: 37.7 % — LOW (ref 39–50)
HGB BLD-MCNC: 12.2 G/DL — LOW (ref 13–17)
IMM GRANULOCYTES NFR BLD AUTO: 2 % — HIGH (ref 0–0.9)
LYMPHOCYTES # BLD AUTO: 1.22 K/UL — SIGNIFICANT CHANGE UP (ref 1–3.3)
LYMPHOCYTES # BLD AUTO: 10 % — LOW (ref 13–44)
MCHC RBC-ENTMCNC: 32.1 PG — SIGNIFICANT CHANGE UP (ref 27–34)
MCHC RBC-ENTMCNC: 32.4 GM/DL — SIGNIFICANT CHANGE UP (ref 32–36)
MCV RBC AUTO: 99.2 FL — SIGNIFICANT CHANGE UP (ref 80–100)
MONOCYTES # BLD AUTO: 1.26 K/UL — HIGH (ref 0–0.9)
MONOCYTES NFR BLD AUTO: 10.3 % — SIGNIFICANT CHANGE UP (ref 2–14)
NEUTROPHILS # BLD AUTO: 9.44 K/UL — HIGH (ref 1.8–7.4)
NEUTROPHILS NFR BLD AUTO: 77.5 % — HIGH (ref 43–77)
PLATELET # BLD AUTO: 439 K/UL — HIGH (ref 150–400)
POTASSIUM SERPL-MCNC: 4.1 MMOL/L — SIGNIFICANT CHANGE UP (ref 3.5–5.3)
POTASSIUM SERPL-SCNC: 4.1 MMOL/L — SIGNIFICANT CHANGE UP (ref 3.5–5.3)
RBC # BLD: 3.8 M/UL — LOW (ref 4.2–5.8)
RBC # FLD: 12.9 % — SIGNIFICANT CHANGE UP (ref 10.3–14.5)
SODIUM SERPL-SCNC: 141 MMOL/L — SIGNIFICANT CHANGE UP (ref 135–145)
WBC # BLD: 12.19 K/UL — HIGH (ref 3.8–10.5)
WBC # FLD AUTO: 12.19 K/UL — HIGH (ref 3.8–10.5)

## 2024-10-23 PROCEDURE — 99239 HOSP IP/OBS DSCHRG MGMT >30: CPT

## 2024-10-23 RX ORDER — CEFUROXIME AXETIL 250 MG
1 TABLET ORAL
Qty: 8 | Refills: 0
Start: 2024-10-23 | End: 2024-10-26

## 2024-10-23 RX ORDER — DOXYCYCLINE HYCLATE 100 MG/1
1 TABLET, FILM COATED ORAL
Qty: 8 | Refills: 0
Start: 2024-10-23 | End: 2024-10-26

## 2024-10-23 RX ORDER — PANTOPRAZOLE SODIUM 40 MG/1
1 TABLET, DELAYED RELEASE ORAL
Qty: 7 | Refills: 0
Start: 2024-10-23 | End: 2024-10-29

## 2024-10-23 RX ADMIN — Medication 10 MILLIGRAM(S): at 09:38

## 2024-10-23 RX ADMIN — TIOTROPIUM BROMIDE AND OLODATEROL 2 PUFF(S): 3.124; 2.736 SPRAY, METERED RESPIRATORY (INHALATION) at 09:29

## 2024-10-23 RX ADMIN — DOXYCYCLINE HYCLATE 110 MILLIGRAM(S): 100 TABLET, FILM COATED ORAL at 09:38

## 2024-10-23 RX ADMIN — Medication 1000 MILLIGRAM(S): at 05:58

## 2024-10-23 RX ADMIN — Medication 125 MICROGRAM(S): at 09:37

## 2024-10-23 RX ADMIN — METHYLPREDNISOLONE ACETATE 40 MILLIGRAM(S): 80 INJECTION, SUSPENSION INTRALESIONAL; INTRAMUSCULAR; INTRASYNOVIAL; SOFT TISSUE at 05:58

## 2024-10-23 RX ADMIN — Medication 40 MILLIGRAM(S): at 05:58

## 2024-10-23 RX ADMIN — Medication 100 MILLIGRAM(S): at 09:37

## 2024-10-23 NOTE — PROGRESS NOTE ADULT - SUBJECTIVE AND OBJECTIVE BOX
Patient is a 84y old  Male who presents with a chief complaint of sob (21 Oct 2024 18:54)      HPI:  84-year-old male with past medical history of A-fib on Xarelto, HTN, HLD, COPD (not on home oxygen), presents to [facility] complaining of shortness of breath x 2 days.  Patient reports a productive cough, subjective fever.  Reports a recent travel to [facility] about 2 weeks ago.  Has no other acute complaints.  Patient denies headache, dizziness, change in vision, blurry vision, anosmia, ageusia, chest pain, palpitations, abdominal pain, nausea, vomiting, diarrhea, constipation, hematochezia, melena, change in bowel movement, weight loss, dysuria, urinary frequency, urgency, hematuria, numbness, weakness or tingling,   In ED,   Vitals: T: 99.2, HR: 123, BP: 155/83, RR: 18, SpO2 87% on room air  Labs: H&H: 12.4 with 37.4, Na+: 134, trops x 1: 14.3, BNP: 2K, lactate: 1.3.  RVP panel: Negative  Imaging: Angio: Emphysema, 9 mm pulmonary nodule, peribronchial thickening/air space consolidation in bilateral lower lobes, mediastinal lymphadenopathy, thyroid nodules    In ED, patient received one-time dose of azithromycin/Rocephin   (20 Oct 2024 11:34)      PAST MEDICAL & SURGICAL HISTORY:  Afib      Afib      COPD, mild      HTN (hypertension)      No significant past surgical history          PREVIOUS DIAGNOSTIC TESTING:      MEDICATIONS  (STANDING):  amLODIPine   Tablet 10 milliGRAM(s) Oral daily  atorvastatin 80 milliGRAM(s) Oral at bedtime  cefTRIAXone Injectable. 1000 milliGRAM(s) IV Push every 24 hours  digoxin     Tablet 125 MICROGram(s) Oral daily  doxycycline IVPB 100 milliGRAM(s) IV Intermittent every 12 hours  furosemide    Tablet 40 milliGRAM(s) Oral daily  metoprolol succinate  milliGRAM(s) Oral daily  rivaroxaban 20 milliGRAM(s) Oral with dinner  tiotropium 2.5 MICROgram(s)/olodaterol 2.5 MICROgram(s) (STIOLTO) Inhaler 2 Puff(s) Inhalation daily      MEDICATIONS  (PRN):  acetaminophen     Tablet .. 650 milliGRAM(s) Oral every 6 hours PRN Mild Pain (1 - 3)  albuterol/ipratropium for Nebulization 3 milliLiter(s) Nebulizer every 6 hours PRN Shortness of Breath and/or Wheezing  aluminum hydroxide/magnesium hydroxide/simethicone Suspension 30 milliLiter(s) Oral every 4 hours PRN Dyspepsia  melatonin 3 milliGRAM(s) Oral at bedtime PRN Insomnia  ondansetron Injectable 4 milliGRAM(s) IV Push every 6 hours PRN Nausea and/or Vomiting      FAMILY HISTORY:  No pertinent family history in first degree relatives        SOCIAL HISTORY:  ***    REVIEW OF SYSTEM:  Pertinent items are noted in HPI.  Vital Signs Last 24 Hrs  T(C): 36.4 (23 Oct 2024 01:40), Max: 36.7 (22 Oct 2024 16:39)  T(F): 97.6 (23 Oct 2024 01:40), Max: 98.1 (22 Oct 2024 16:39)  HR: 66 (23 Oct 2024 05:50) (48 - 93)  BP: 108/70 (23 Oct 2024 05:50) (108/70 - 122/84)  BP(mean): --  RR: 18 (23 Oct 2024 01:40) (18 - 19)  SpO2: 95% (23 Oct 2024 05:50) (94% - 97%)    Parameters below as of 23 Oct 2024 05:50  Patient On (Oxygen Delivery Method): nasal cannula          PHYSICAL EXAM  General Appearance: cooperative, no acute distress,   HEENT: PERRL, conjunctiva clear, EOM's intact, non injected pharynx, no exudate, TM   normal  Neck: Supple, , no adenopathy, thyroid: not enlarged, no carotid bruit or JVD  Back: Symmetric, no  tenderness,no soft tissue tenderness  Lungs: bilateral lower lobe rhonchi, exp wheeze  Heart: Regular rate and rhythm, S1, S2 normal, no murmur, rub or gallop  Abdomen: Soft, non-tender, bowel sounds active , no hepatosplenomegaly  Extremities: no cyanosis or edema, no joint swelling  Skin: Skin color, texture normal, no rashes   Neurologic: Alert and oriented X3 , cranial nerves intact, sensory and motor normal,    ECG:    LABS:                          11.9   16.35 )-----------( 402      ( 22 Oct 2024 07:24 )             36.4     10-21    135  |  105  |  34[H]  ----------------------------<  146[H]  4.3   |  26  |  1.06    Ca    9.4      21 Oct 2024 07:09                Urinalysis Basic - ( 21 Oct 2024 07:09 )    Color: x / Appearance: x / SG: x / pH: x  Gluc: 146 mg/dL / Ketone: x  / Bili: x / Urobili: x   Blood: x / Protein: x / Nitrite: x   Leuk Esterase: x / RBC: x / WBC x   Sq Epi: x / Non Sq Epi: x / Bacteria: x            RADIOLOGY & ADDITIONAL STUDIES:  < from: CT Angio Chest PE Protocol w/ IV Cont (10.20.24 @ 06:38) >  IMPRESSION:  No pulmonary embolism.    Suspect bilateral lower lobe pneumonia as detailed above. Follow-up to   resolution recommended.    Multiple pulmonary nodules, largest measuring up to 9 mm. Fleischner   Society guidelines recommends a follow-up CT scan at 3-6 months, then   consider CT at 18-24 months.    Multiple hypodense thyroid nodules on the right, largest measuring up to   2.5 cm.. Nonemergent ultrasound recommended to further evaluate.    Additional findings as above.    < end of copied text >    
84-year-old male with past medical history of A-fib on Xarelto, HTN, HLD, COPD (not on home oxygen), presents to [facility] complaining of shortness of breath x 2 days.  Patient reports a productive cough, subjective fever.  Reports a recent travel to [facility] about 2 weeks ago.  Has no other acute complaints.  Patient denies headache, dizziness, change in vision, blurry vision, anosmia, ageusia, chest pain, palpitations, abdominal pain, nausea, vomiting, diarrhea, constipation, hematochezia, melena, change in bowel movement, weight loss, dysuria, urinary frequency, urgency, hematuria, numbness, weakness or tingling,   In ED,   Vitals: T: 99.2, HR: 123, BP: 155/83, RR: 18, SpO2 87% on room air  Labs: H&H: 12.4 with 37.4, Na+: 134, trops x 1: 14.3, BNP: 2K, lactate: 1.3.  RVP panel: Negative  Imaging: Angio: Emphysema, 9 mm pulmonary nodule, peribronchial thickening/air space consolidation in bilateral lower lobes, mediastinal lymphadenopathy, thyroid nodules    In ED, patient received one-time dose of azithromycin/Rocephin    10/22 feels better , still with cough, but no SOB  on 2l NC    PHYSICAL EXAM   Gen: NAD, comfortable, AA&Ox4  HEENT: head atrumatic and normocephalic, PEERLA, EOMI,  no gross abnormalities of ears, mucous membranes moist, no oral lesions, neck supple without masses/goiter/lymphadenopathy, no JVD  CVS: +s1, s2, +irregular irregular   Pulmonary: +rhonchi diffusely   Abdomen: soft, non-tender, non-distended, +bowel sounds in all 4 quadrants, no masses noted, no guarding or rigidity   Back: no scoliosis, lordosis, or kyphosis, no point tenderness, no CVA tenderness   Extremities: no pedal edema, pedal pulses palpable, <2 sec. cap refill   Skin: nl warm and dry, no wounds   Neuro: grossly non-focal    labs reviewed
84-year-old male with past medical history of A-fib on Xarelto, HTN, HLD, COPD (not on home oxygen), presents to [facility] complaining of shortness of breath x 2 days.  Patient reports a productive cough, subjective fever.  Reports a recent travel to [facility] about 2 weeks ago.  Has no other acute complaints.  Patient denies headache, dizziness, change in vision, blurry vision, anosmia, ageusia, chest pain, palpitations, abdominal pain, nausea, vomiting, diarrhea, constipation, hematochezia, melena, change in bowel movement, weight loss, dysuria, urinary frequency, urgency, hematuria, numbness, weakness or tingling,   In ED,   Vitals: T: 99.2, HR: 123, BP: 155/83, RR: 18, SpO2 87% on room air  Labs: H&H: 12.4 with 37.4, Na+: 134, trops x 1: 14.3, BNP: 2K, lactate: 1.3.  RVP panel: Negative  Imaging: Angio: Emphysema, 9 mm pulmonary nodule, peribronchial thickening/air space consolidation in bilateral lower lobes, mediastinal lymphadenopathy, thyroid nodules    In ED, patient received one-time dose of azithromycin/Rocephin    10/21 feels better , still with cough, but no SOB     PHYSICAL EXAM   Gen: NAD, comfortable, AA&Ox4  HEENT: head atrumatic and normocephalic, PEERLA, EOMI,  no gross abnormalities of ears, mucous membranes moist, no oral lesions, neck supple without masses/goiter/lymphadenopathy, no JVD  CVS: +s1, s2, +irregular irregular   Pulmonary: +rhonchi diffusely   Abdomen: soft, non-tender, non-distended, +bowel sounds in all 4 quadrants, no masses noted, no guarding or rigidity   Back: no scoliosis, lordosis, or kyphosis, no point tenderness, no CVA tenderness   Extremities: no pedal edema, pedal pulses palpable, <2 sec. cap refill   Skin: nl warm and dry, no wounds   Neuro: grossly non-focal      PHYSICAL EXAM:    Daily     Daily Weight in k.6 (21 Oct 2024 01:47)    ICU Vital Signs Last 24 Hrs  T(C): 36.8 (21 Oct 2024 15:18), Max: 36.8 (21 Oct 2024 08:33)  T(F): 98.2 (21 Oct 2024 15:18), Max: 98.2 (21 Oct 2024 08:33)  HR: 98 (21 Oct 2024 15:18) (76 - 98)  BP: 108/63 (21 Oct 2024 15:18) (108/63 - 123/78)  BP(mean): --  ABP: --  ABP(mean): --  RR: 18 (21 Oct 2024 15:18) (18 - 18)  SpO2: 94% (21 Oct 2024 15:18) (92% - 94%)    O2 Parameters below as of 21 Oct 2024 15:18  Patient On (Oxygen Delivery Method): nasal cannula  O2 Flow (L/min): 2                                  12.4   14.27 )-----------( 355      ( 21 Oct 2024 07:09 )             37.3       CBC Full  -  ( 21 Oct 2024 07:09 )  WBC Count : 14.27 K/uL  RBC Count : 3.85 M/uL  Hemoglobin : 12.4 g/dL  Hematocrit : 37.3 %  Platelet Count - Automated : 355 K/uL  Mean Cell Volume : 96.9 fl  Mean Cell Hemoglobin : 32.2 pg  Mean Cell Hemoglobin Concentration : 33.2 gm/dL  Auto Neutrophil # : x  Auto Lymphocyte # : x  Auto Monocyte # : x  Auto Eosinophil # : x  Auto Basophil # : x  Auto Neutrophil % : x  Auto Lymphocyte % : x  Auto Monocyte % : x  Auto Eosinophil % : x  Auto Basophil % : x      10-21    135  |  105  |  34[H]  ----------------------------<  146[H]  4.3   |  26  |  1.06    Ca    9.4      21 Oct 2024 07:09  Mg     1.6     10-20    TPro  7.1  /  Alb  3.0[L]  /  TBili  0.7  /  DBili  x   /  AST  24  /  ALT  27  /  AlkPhos  103  10-20      LIVER FUNCTIONS - ( 20 Oct 2024 04:23 )  Alb: 3.0 g/dL / Pro: 7.1 gm/dL / ALK PHOS: 103 U/L / ALT: 27 U/L / AST: 24 U/L / GGT: x             PT/INR - ( 20 Oct 2024 04:23 )   PT: 15.0 sec;   INR: 1.31 ratio         PTT - ( 20 Oct 2024 04:23 )  PTT:49.8 sec          Urinalysis Basic - ( 21 Oct 2024 07:09 )    Color: x / Appearance: x / SG: x / pH: x  Gluc: 146 mg/dL / Ketone: x  / Bili: x / Urobili: x   Blood: x / Protein: x / Nitrite: x   Leuk Esterase: x / RBC: x / WBC x   Sq Epi: x / Non Sq Epi: x / Bacteria: x            MEDICATIONS  (STANDING):  amLODIPine   Tablet 10 milliGRAM(s) Oral daily  atorvastatin 80 milliGRAM(s) Oral at bedtime  cefTRIAXone Injectable. 1000 milliGRAM(s) IV Push every 24 hours  digoxin     Tablet 250 MICROGram(s) Oral daily  diltiazem Injectable 10 milliGRAM(s) IV Push once  doxycycline IVPB 100 milliGRAM(s) IV Intermittent every 12 hours  furosemide    Tablet 40 milliGRAM(s) Oral daily  methylPREDNISolone sodium succinate Injectable 40 milliGRAM(s) IV Push daily  metoprolol succinate  milliGRAM(s) Oral daily  rivaroxaban 20 milliGRAM(s) Oral with dinner  tiotropium 2.5 MICROgram(s) Inhaler 2 Puff(s) Inhalation daily

## 2024-10-23 NOTE — DISCHARGE NOTE NURSING/CASE MANAGEMENT/SOCIAL WORK - NSDCPEFALRISK_GEN_ALL_CORE
For information on Fall & Injury Prevention, visit: [email or web address] OR  [email or web address] OR  https://www.cdc.gov/steadi/patient.html

## 2024-10-23 NOTE — DISCHARGE NOTE NURSING/CASE MANAGEMENT/SOCIAL WORK - PATIENT PORTAL LINK FT
You can access the FollowMyHealth Patient Portal offered by [facility] by registering at the following website: [email or web address]. By joining [facility]’s FollowMyHealth portal, you will also be able to view your health information using other applications (apps) compatible with our system.

## 2024-10-23 NOTE — DISCHARGE NOTE PROVIDER - CARE PROVIDER_API CALL
Cale Ricardo  Pulmonary Disease  [address] [place] [address]  Phone: [phone]  Fax: [phone]  Follow Up Time:     primary doctor,   Phone: (   )    -  Fax: (   )    -  Follow Up Time:     Maciel Macedo  Cardiovascular Disease  [address] [place] [address]  Phone: [phone]  Fax: [phone]  Follow Up Time:

## 2024-10-23 NOTE — DISCHARGE NOTE NURSING/CASE MANAGEMENT/SOCIAL WORK - FINANCIAL ASSISTANCE
[facility] provides services at a reduced cost to those who are determined to be eligible through [facility]’s financial assistance program. Information regarding [facility]’s financial assistance program can be found by going to [email or web address] or by calling [phone].

## 2024-10-23 NOTE — DISCHARGE NOTE PROVIDER - HOSPITAL COURSE
84-year-old male with past medical history of A-fib on Xarelto, HTN, HLD, COPD (not on home oxygen), presents to [facility] complaining of shortness of breath x 2 days.    #Acute Hypoxic Respiratory Failure,secondary to B/L lower lobe pneumonia suspected gram negative rods  with , possible COPD exacerbation   -RVP: negative   no PE on CTA  indeterminate pulm nodules will need fu- discussed outpt ct scan in 3 months recommended for fu  -CXR: as above   -CTA as above -IV antibiotics: s/p 3 days Doxy/Rocephin  , now switch to ceftin 500mg BID and doxycycline  for 4 more days  -s/p Solumedrol 40mg daily, prednisone 40 mg daily for 3 more days   -Spiriva   -Albuterol PRN   did not meet criteria for home O2    on ambulation pulse ox>90      #Afib with RVR   -likely exacerbated due to illness now rate controlled   -c/w Xarelto -c/w Digoxin  -c/w Metoprolol Succinate     #Abnormal CT findings  -9 mm pulmonary nodule in the right upper lobe, new from prior, nonspecific areas of architectural distortion in the left upper lobe, additional groundglass area space passages in the right upper lobe and superior segment of the right lower lobe, 6 mm spiculated pulmonary nodule in the right upper lobe series new from prior: Follow-up as outpatient  -Multiple thyroid nodules: Follow-up as outpatient  -Multiple punctate calcification in the pancreas: Follow-up as outpatient  -Mild thickening of the left adrenal gland: Follow-up as outpatient  -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 10/23 feels better , still with cough, but no SOB  , weaned off O2, on room air now      PHYSICAL EXAM   Gen: NAD, comfortable, AA&Ox4  HEENT: head atrumatic and normocephalic, PEERLA, EOMI,  no gross abnormalities of ears, mucous membranes moist, no oral lesions, neck supple without masses/goiter/lymphadenopathy, no JVD  CVS: +s1, s2, +irregular irregular   Pulmonary:mild exp wheeze  Abdomen: soft, non-tender, non-distended, +bowel sounds in all 4 quadrants, no masses noted, no guarding or rigidity   Back: no scoliosis, lordosis, or kyphosis, no point tenderness, no CVA tenderness   Extremities: no pedal edema, pedal pulses palpable, <2 sec. cap refill   Skin: nl warm and dry, no wounds   Neuro: grossly non-focal    discharge time 55mins

## 2024-10-23 NOTE — DISCHARGE NOTE PROVIDER - NSDCMRMEDTOKEN_GEN_ALL_CORE_FT
amLODIPine 10 mg oral tablet: 1 tab(s) orally once a day  atorvastatin 80 mg oral tablet: 1 tab(s) orally once a day (at bedtime)  cefuroxime 500 mg oral tablet: 1 tab(s) orally 2 times a day  digoxin 250 mcg (0.25 mg) oral tablet: 1 tab(s) orally once a day  doxycycline hyclate 100 mg oral tablet: 1 tab(s) orally 2 times a day  furosemide 40 mg oral tablet: 1 tab(s) orally once a day  metoprolol succinate 100 mg oral tablet, extended release: 1 tab(s) orally once a day  pantoprazole 40 mg oral delayed release tablet: 1 tab(s) orally once a day  Potassium Chloride (Eqv-K-Tab) 10 mEq oral tablet, extended release: 1 tab(s) orally once a day  predniSONE 20 mg oral tablet: 2 tab(s) orally once a day  rivaroxaban 20 mg oral tablet: 1 tab(s) orally once a day (in the evening with dinner)  Spiriva Respimat 28 ACT 2.5 mcg/inh inhalation aerosol: 2 puff(s) inhaled once a day

## 2024-10-23 NOTE — DISCHARGE NOTE PROVIDER - NSDCFUADDAPPT_GEN_ALL_CORE_FT
CARDIOLOGY FOLLOW UP IS ON 10/30/24 @ 10:30AM WITH DR MAXWELL DENG   APPTS ARE READY TO BE MADE: [ ] YES    Best Family or Patient Contact (if needed):    Additional Information about above appointments (if needed):    1: Dr Ricardo  2: Dr deng  3: primary doctor    Other comments or requests:

## 2024-10-23 NOTE — DISCHARGE NOTE PROVIDER - PROVIDER TOKENS
PROVIDER:[TOKEN:[[id number]]],FREE:[LAST:[primary doctor],PHONE:[(   )    -],FAX:[(   )    -]],PROVIDER:[TOKEN:[[id number]]]

## 2024-10-23 NOTE — STUDENT SIGN OFF DOCUMENT - COPY OF STUDENT DOCUMENT REVIEW
Genie Plata, Adjunct Clinical Professor - [facility]
Genie Plata - Clinical  - [facility]

## 2024-10-23 NOTE — DISCHARGE NOTE PROVIDER - NSDCCPCAREPLAN_GEN_ALL_CORE_FT
PRINCIPAL DISCHARGE DIAGNOSIS  Diagnosis: Community acquired pneumonia  Assessment and Plan of Treatment: please follow up with Dr Ricardo in 1 week or as recommended by him in office   you would need repeat CT chest in 3 months to monitor lung nodule  follow up with your cardiologist as scheduled   follow up with your PCP for outpatient thyroid ultrasound to evaluate nodule  and outpatient adrenal ultrasound      SECONDARY DISCHARGE DIAGNOSES  Diagnosis: Rapid atrial fibrillation  Assessment and Plan of Treatment:     Diagnosis: Acute on chronic systolic congestive heart failure  Assessment and Plan of Treatment:

## 2024-10-23 NOTE — PROGRESS NOTE ADULT - ASSESSMENT
84-year-old male with past medical history of A-fib on Xarelto, HTN, HLD, COPD (not on home oxygen), presents to [facility] complaining of shortness of breath x 2 days.      #Acute Hypoxic Respiratory Failure, unable to rule out if Gram negative in etiology, Hx of COPD, possible COPD exacerbation   -RVP: negative   -CXR: as above   -CTA as above -IV antibiotics: Doxy/Rocephin    -Solumedrol 40mg daily  -Spiriva   -Albuterol PRN   -supplemental oxygen PRN with target SpO2: 92-96%  -incentive spirometry    #Afib with RVR   -likely exacerbated due to illness   -c/w Xarelto   -c/w Digoxin  -c/w Metoprolol Succinate     #Abnormal CT findings  -9 mm pulmonary nodule in the right upper lobe, new from prior, nonspecific areas of architectural distortion in the left upper lobe, additional groundglass area space passages in the right upper lobe and superior segment of the right lower lobe, 6 mm spiculated pulmonary nodule in the right upper lobe series new from prior: Follow-up as outpatient  -Multiple thyroid nodules: Follow-up as outpatient  -Multiple punctate calcification in the pancreas: Follow-up as outpatient  -Mild thickening of the left adrenal gland: Follow-up as outpatient  -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
84-year-old male with past medical history of A-fib on Xarelto, HTN, HLD, COPD (not on home oxygen), presents to [facility] complaining of shortness of breath x 2 days.  Patient reports a productive cough, subjective fever.  Reports a recent travel to [facility] about 2 weeks ago.  Has no other acute complaints.  Patient denies headache, dizziness, change in vision, blurry vision, anosmia, ageusia, chest pain, palpitations, abdominal pain, nausea, vomiting, diarrhea, constipation, hematochezia, melena, change in bowel movement, weight loss, dysuria, urinary frequency, urgency, hematuria, numbness, weakness or tingling,   In ED,   Vitals: T: 99.2, HR: 123, BP: 155/83, RR: 18, SpO2 87% on room air  Labs: H&H: 12.4 with 37.4, Na+: 134, trops x 1: 14.3, BNP: 2K, lactate: 1.3.  RVP panel: Negative  Imaging: Angio: Emphysema, 9 mm pulmonary nodule, peribronchial thickening/air space consolidation in bilateral lower lobes, mediastinal lymphadenopathy, thyroid nodules    In ED, patient received one-time dose of azithromycin/Rocephin    Assessment / plan;  Bilateral lower lobe consolidation / pneumonia post travel hx  Acute COPD exacerbation  hx Vats for spont pneumothorax 2019  indeterminate pulm nodules will need fu- discussed  no PE on CTA  eval for hypoxemia on exertion    agree with admission  IV antibiotics  sterids discontinued  sputum gs/ cx  walk test r/o hypoxemia on exertion  will fu as outpt now  outpt ct scan in 3 months recommended for fu  
84-year-old male with past medical history of A-fib on Xarelto, HTN, HLD, COPD (not on home oxygen), presents to [facility] complaining of shortness of breath x 2 days.      #Acute Hypoxic Respiratory Failure, unable to rule out if Gram negative in etiology, Hx of COPD, possible COPD exacerbation   -RVP: negative   -CXR: as above   -CTA as above -IV antibiotics: Doxy/Rocephin    -Solumedrol 40mg daily  -Spiriva   -Albuterol PRN   -supplemental oxygen PRN with target SpO2: 92-96%  -incentive spirometry    #Afib with RVR   -likely exacerbated due to illness   -c/w Xarelto   -c/w Digoxin  -c/w Metoprolol Succinate     #Abnormal CT findings  -9 mm pulmonary nodule in the right upper lobe, new from prior, nonspecific areas of architectural distortion in the left upper lobe, additional groundglass area space passages in the right upper lobe and superior segment of the right lower lobe, 6 mm spiculated pulmonary nodule in the right upper lobe series new from prior: Follow-up as outpatient  -Multiple thyroid nodules: Follow-up as outpatient  -Multiple punctate calcification in the pancreas: Follow-up as outpatient  -Mild thickening of the left adrenal gland: Follow-up as outpatient  -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

## 2024-10-25 LAB
CULTURE RESULTS: SIGNIFICANT CHANGE UP
CULTURE RESULTS: SIGNIFICANT CHANGE UP
SPECIMEN SOURCE: SIGNIFICANT CHANGE UP
SPECIMEN SOURCE: SIGNIFICANT CHANGE UP

## 2024-10-30 DIAGNOSIS — J44.1 CHRONIC OBSTRUCTIVE PULMONARY DISEASE WITH (ACUTE) EXACERBATION: ICD-10-CM

## 2024-10-30 DIAGNOSIS — I48.91 UNSPECIFIED ATRIAL FIBRILLATION: ICD-10-CM

## 2024-10-30 DIAGNOSIS — I50.23 ACUTE ON CHRONIC SYSTOLIC (CONGESTIVE) HEART FAILURE: ICD-10-CM

## 2024-10-30 DIAGNOSIS — E78.5 HYPERLIPIDEMIA, UNSPECIFIED: ICD-10-CM

## 2024-10-30 DIAGNOSIS — J15.69 PNEUMONIA DUE TO OTHER GRAM-NEGATIVE BACTERIA: ICD-10-CM

## 2024-10-30 DIAGNOSIS — I11.0 HYPERTENSIVE HEART DISEASE WITH HEART FAILURE: ICD-10-CM

## 2024-10-30 DIAGNOSIS — J44.0 CHRONIC OBSTRUCTIVE PULMONARY DISEASE WITH (ACUTE) LOWER RESPIRATORY INFECTION: ICD-10-CM

## 2024-10-30 DIAGNOSIS — J96.01 ACUTE RESPIRATORY FAILURE WITH HYPOXIA: ICD-10-CM

## 2024-10-30 DIAGNOSIS — Z79.01 LONG TERM (CURRENT) USE OF ANTICOAGULANTS: ICD-10-CM

## 2024-10-30 DIAGNOSIS — R91.8 OTHER NONSPECIFIC ABNORMAL FINDING OF LUNG FIELD: ICD-10-CM

## 2025-03-05 NOTE — CONSULT NOTE ADULT - CONSULT REQUESTED DATE/TIME
LAST VISIT: 2/27/24  No follow up scheduled (patient cancelled last appointment)    Medication refused with note to pharmacy that patient needs an appointment. Please make any changes needed. Thank you.     
22-Oct-2024 08:10

## 2025-05-12 ENCOUNTER — OUTPATIENT (OUTPATIENT)
Dept: OUTPATIENT SERVICES | Facility: HOSPITAL | Age: 85
LOS: 1 days | End: 2025-05-12
Payer: MEDICARE

## 2025-05-12 DIAGNOSIS — L97.519 NON-PRESSURE CHRONIC ULCER OF OTHER PART OF RIGHT FOOT WITH UNSPECIFIED SEVERITY: ICD-10-CM

## 2025-05-12 PROCEDURE — 73630 X-RAY EXAM OF FOOT: CPT | Mod: 26,RT

## 2025-05-12 PROCEDURE — 73630 X-RAY EXAM OF FOOT: CPT | Mod: RT

## 2025-05-12 PROCEDURE — 99202 OFFICE O/P NEW SF 15 MIN: CPT

## 2025-05-16 DIAGNOSIS — M79.671 PAIN IN RIGHT FOOT: ICD-10-CM

## 2025-05-16 DIAGNOSIS — G90.09 OTHER IDIOPATHIC PERIPHERAL AUTONOMIC NEUROPATHY: ICD-10-CM

## 2025-05-16 DIAGNOSIS — M19.90 UNSPECIFIED OSTEOARTHRITIS, UNSPECIFIED SITE: ICD-10-CM

## 2025-05-16 DIAGNOSIS — L97.512 NON-PRESSURE CHRONIC ULCER OF OTHER PART OF RIGHT FOOT WITH FAT LAYER EXPOSED: ICD-10-CM

## 2025-05-16 DIAGNOSIS — J44.9 CHRONIC OBSTRUCTIVE PULMONARY DISEASE, UNSPECIFIED: ICD-10-CM

## 2025-05-16 DIAGNOSIS — R26.2 DIFFICULTY IN WALKING, NOT ELSEWHERE CLASSIFIED: ICD-10-CM

## 2025-05-16 DIAGNOSIS — I10 ESSENTIAL (PRIMARY) HYPERTENSION: ICD-10-CM

## 2025-05-30 ENCOUNTER — OUTPATIENT (OUTPATIENT)
Dept: OUTPATIENT SERVICES | Facility: HOSPITAL | Age: 85
LOS: 1 days | End: 2025-05-30
Payer: MEDICARE

## 2025-05-30 DIAGNOSIS — L97.519 NON-PRESSURE CHRONIC ULCER OF OTHER PART OF RIGHT FOOT WITH UNSPECIFIED SEVERITY: ICD-10-CM

## 2025-05-30 PROCEDURE — 97597 DBRDMT OPN WND 1ST 20 CM/<: CPT

## 2025-06-06 DIAGNOSIS — G90.09 OTHER IDIOPATHIC PERIPHERAL AUTONOMIC NEUROPATHY: ICD-10-CM

## 2025-06-06 DIAGNOSIS — J44.9 CHRONIC OBSTRUCTIVE PULMONARY DISEASE, UNSPECIFIED: ICD-10-CM

## 2025-06-06 DIAGNOSIS — M79.671 PAIN IN RIGHT FOOT: ICD-10-CM

## 2025-06-06 DIAGNOSIS — L97.512 NON-PRESSURE CHRONIC ULCER OF OTHER PART OF RIGHT FOOT WITH FAT LAYER EXPOSED: ICD-10-CM

## 2025-06-06 DIAGNOSIS — I10 ESSENTIAL (PRIMARY) HYPERTENSION: ICD-10-CM

## 2025-06-06 DIAGNOSIS — R26.2 DIFFICULTY IN WALKING, NOT ELSEWHERE CLASSIFIED: ICD-10-CM

## 2025-06-06 DIAGNOSIS — M19.90 UNSPECIFIED OSTEOARTHRITIS, UNSPECIFIED SITE: ICD-10-CM

## 2025-06-13 ENCOUNTER — OUTPATIENT (OUTPATIENT)
Dept: OUTPATIENT SERVICES | Facility: HOSPITAL | Age: 85
LOS: 1 days | End: 2025-06-13
Payer: MEDICARE

## 2025-06-13 DIAGNOSIS — L97.519 NON-PRESSURE CHRONIC ULCER OF OTHER PART OF RIGHT FOOT WITH UNSPECIFIED SEVERITY: ICD-10-CM

## 2025-06-13 PROCEDURE — 11042 DBRDMT SUBQ TIS 1ST 20SQCM/<: CPT

## 2025-06-20 ENCOUNTER — OUTPATIENT (OUTPATIENT)
Dept: OUTPATIENT SERVICES | Facility: HOSPITAL | Age: 85
LOS: 1 days | End: 2025-06-20
Payer: MEDICARE

## 2025-06-20 DIAGNOSIS — L97.512 NON-PRESSURE CHRONIC ULCER OF OTHER PART OF RIGHT FOOT WITH FAT LAYER EXPOSED: ICD-10-CM

## 2025-06-20 DIAGNOSIS — G90.09 OTHER IDIOPATHIC PERIPHERAL AUTONOMIC NEUROPATHY: ICD-10-CM

## 2025-06-20 DIAGNOSIS — L97.519 NON-PRESSURE CHRONIC ULCER OF OTHER PART OF RIGHT FOOT WITH UNSPECIFIED SEVERITY: ICD-10-CM

## 2025-06-20 DIAGNOSIS — M19.90 UNSPECIFIED OSTEOARTHRITIS, UNSPECIFIED SITE: ICD-10-CM

## 2025-06-20 DIAGNOSIS — M79.671 PAIN IN RIGHT FOOT: ICD-10-CM

## 2025-06-20 DIAGNOSIS — R26.2 DIFFICULTY IN WALKING, NOT ELSEWHERE CLASSIFIED: ICD-10-CM

## 2025-06-20 DIAGNOSIS — I10 ESSENTIAL (PRIMARY) HYPERTENSION: ICD-10-CM

## 2025-06-20 DIAGNOSIS — J44.9 CHRONIC OBSTRUCTIVE PULMONARY DISEASE, UNSPECIFIED: ICD-10-CM

## 2025-06-20 PROCEDURE — 99213 OFFICE O/P EST LOW 20 MIN: CPT

## 2025-06-27 DIAGNOSIS — G90.09 OTHER IDIOPATHIC PERIPHERAL AUTONOMIC NEUROPATHY: ICD-10-CM

## 2025-06-27 DIAGNOSIS — L97.512 NON-PRESSURE CHRONIC ULCER OF OTHER PART OF RIGHT FOOT WITH FAT LAYER EXPOSED: ICD-10-CM

## 2025-06-27 DIAGNOSIS — M19.90 UNSPECIFIED OSTEOARTHRITIS, UNSPECIFIED SITE: ICD-10-CM

## 2025-06-27 DIAGNOSIS — J44.9 CHRONIC OBSTRUCTIVE PULMONARY DISEASE, UNSPECIFIED: ICD-10-CM

## 2025-06-27 DIAGNOSIS — I10 ESSENTIAL (PRIMARY) HYPERTENSION: ICD-10-CM

## 2025-06-27 DIAGNOSIS — M79.671 PAIN IN RIGHT FOOT: ICD-10-CM

## 2025-06-27 DIAGNOSIS — R26.2 DIFFICULTY IN WALKING, NOT ELSEWHERE CLASSIFIED: ICD-10-CM

## 2025-06-30 ENCOUNTER — OUTPATIENT (OUTPATIENT)
Dept: OUTPATIENT SERVICES | Facility: HOSPITAL | Age: 85
LOS: 1 days | End: 2025-06-30

## 2025-06-30 DIAGNOSIS — L97.519 NON-PRESSURE CHRONIC ULCER OF OTHER PART OF RIGHT FOOT WITH UNSPECIFIED SEVERITY: ICD-10-CM

## 2025-07-08 DIAGNOSIS — L97.512 NON-PRESSURE CHRONIC ULCER OF OTHER PART OF RIGHT FOOT WITH FAT LAYER EXPOSED: ICD-10-CM

## 2025-07-08 DIAGNOSIS — M79.671 PAIN IN RIGHT FOOT: ICD-10-CM

## 2025-07-08 DIAGNOSIS — G90.09 OTHER IDIOPATHIC PERIPHERAL AUTONOMIC NEUROPATHY: ICD-10-CM

## 2025-07-08 DIAGNOSIS — M06.9 RHEUMATOID ARTHRITIS, UNSPECIFIED: ICD-10-CM

## 2025-07-08 DIAGNOSIS — R26.2 DIFFICULTY IN WALKING, NOT ELSEWHERE CLASSIFIED: ICD-10-CM

## 2025-07-08 DIAGNOSIS — I10 ESSENTIAL (PRIMARY) HYPERTENSION: ICD-10-CM

## 2025-07-08 DIAGNOSIS — M19.90 UNSPECIFIED OSTEOARTHRITIS, UNSPECIFIED SITE: ICD-10-CM

## 2025-07-08 DIAGNOSIS — J44.9 CHRONIC OBSTRUCTIVE PULMONARY DISEASE, UNSPECIFIED: ICD-10-CM
